# Patient Record
Sex: MALE | Race: WHITE | NOT HISPANIC OR LATINO | Employment: FULL TIME | URBAN - METROPOLITAN AREA
[De-identification: names, ages, dates, MRNs, and addresses within clinical notes are randomized per-mention and may not be internally consistent; named-entity substitution may affect disease eponyms.]

---

## 2023-05-24 ENCOUNTER — OFFICE VISIT (OUTPATIENT)
Dept: PRIMARY CARE CLINIC | Facility: CLINIC | Age: 38
End: 2023-05-24
Payer: COMMERCIAL

## 2023-05-24 VITALS
RESPIRATION RATE: 17 BRPM | HEART RATE: 68 BPM | OXYGEN SATURATION: 100 % | WEIGHT: 191.56 LBS | HEIGHT: 69 IN | DIASTOLIC BLOOD PRESSURE: 62 MMHG | TEMPERATURE: 98 F | SYSTOLIC BLOOD PRESSURE: 110 MMHG | BODY MASS INDEX: 28.37 KG/M2

## 2023-05-24 DIAGNOSIS — K52.9 ENTERITIS: Primary | ICD-10-CM

## 2023-05-24 PROCEDURE — 99203 PR OFFICE/OUTPT VISIT, NEW, LEVL III, 30-44 MIN: ICD-10-PCS | Mod: S$GLB,,, | Performed by: INTERNAL MEDICINE

## 2023-05-24 PROCEDURE — 3074F SYST BP LT 130 MM HG: CPT | Mod: CPTII,S$GLB,, | Performed by: INTERNAL MEDICINE

## 2023-05-24 PROCEDURE — 3008F PR BODY MASS INDEX (BMI) DOCUMENTED: ICD-10-PCS | Mod: CPTII,S$GLB,, | Performed by: INTERNAL MEDICINE

## 2023-05-24 PROCEDURE — 3008F BODY MASS INDEX DOCD: CPT | Mod: CPTII,S$GLB,, | Performed by: INTERNAL MEDICINE

## 2023-05-24 PROCEDURE — 3078F DIAST BP <80 MM HG: CPT | Mod: CPTII,S$GLB,, | Performed by: INTERNAL MEDICINE

## 2023-05-24 PROCEDURE — 1160F PR REVIEW ALL MEDS BY PRESCRIBER/CLIN PHARMACIST DOCUMENTED: ICD-10-PCS | Mod: CPTII,S$GLB,, | Performed by: INTERNAL MEDICINE

## 2023-05-24 PROCEDURE — 99999 PR PBB SHADOW E&M-NEW PATIENT-LVL III: ICD-10-PCS | Mod: PBBFAC,,, | Performed by: INTERNAL MEDICINE

## 2023-05-24 PROCEDURE — 99999 PR PBB SHADOW E&M-NEW PATIENT-LVL III: CPT | Mod: PBBFAC,,, | Performed by: INTERNAL MEDICINE

## 2023-05-24 PROCEDURE — 1159F PR MEDICATION LIST DOCUMENTED IN MEDICAL RECORD: ICD-10-PCS | Mod: CPTII,S$GLB,, | Performed by: INTERNAL MEDICINE

## 2023-05-24 PROCEDURE — 99203 OFFICE O/P NEW LOW 30 MIN: CPT | Mod: S$GLB,,, | Performed by: INTERNAL MEDICINE

## 2023-05-24 PROCEDURE — 3078F PR MOST RECENT DIASTOLIC BLOOD PRESSURE < 80 MM HG: ICD-10-PCS | Mod: CPTII,S$GLB,, | Performed by: INTERNAL MEDICINE

## 2023-05-24 PROCEDURE — 1159F MED LIST DOCD IN RCRD: CPT | Mod: CPTII,S$GLB,, | Performed by: INTERNAL MEDICINE

## 2023-05-24 PROCEDURE — 1160F RVW MEDS BY RX/DR IN RCRD: CPT | Mod: CPTII,S$GLB,, | Performed by: INTERNAL MEDICINE

## 2023-05-24 PROCEDURE — 3074F PR MOST RECENT SYSTOLIC BLOOD PRESSURE < 130 MM HG: ICD-10-PCS | Mod: CPTII,S$GLB,, | Performed by: INTERNAL MEDICINE

## 2023-05-24 NOTE — PROGRESS NOTES
"Subjective:       Patient ID: Igor Desai is a 37 y.o. male.    Chief Complaint: Diarrhea and Abdominal Pain      HPI:  Patient is a 37-year-old male presenting today with complaints of diarrhea.  He states 6 days ago he started with diarrhea and some abdominal cramping.  The cramping has sort of resolved over the a few days but the diarrhea remains.  He states he is having frequent watery diarrhea.  There has been no blood in the stools.  No reported mucus.  He is not having any fever or chills.  No vomiting.  He states that his 1-year-old had some diarrhea for a couple of days just before the symptoms started but he is not sure if it maybe related.  No other sick contacts that he is aware of.  No recent antibiotic use.  Four days ago he tried some Pepto-Bismol but it did not make any significant impact    Review of Systems   Constitutional:  Negative for fever and unexpected weight change.   HENT:  Negative for hearing loss, postnasal drip and rhinorrhea.    Eyes:  Negative for pain and visual disturbance.   Respiratory:  Negative for cough, shortness of breath and wheezing.    Cardiovascular:  Negative for chest pain and palpitations.   Gastrointestinal:  Positive for diarrhea. Negative for constipation, nausea and vomiting.   Genitourinary:  Negative for dysuria and hematuria.   Musculoskeletal:  Negative for arthralgias, back pain, myalgias and neck stiffness.   Skin:  Negative for pallor and rash.   Neurological:  Negative for seizures, syncope and headaches.   Hematological:  Negative for adenopathy.   Psychiatric/Behavioral:  Negative for dysphoric mood. The patient is not nervous/anxious.      Objective:   /62   Pulse 68   Temp 98.2 °F (36.8 °C) (Temporal)   Resp 17   Ht 5' 9" (1.753 m)   Wt 86.9 kg (191 lb 9.3 oz)   SpO2 100%   BMI 28.29 kg/m²      Physical Exam  Vitals reviewed.   Constitutional:       General: He is not in acute distress.     Appearance: He is well-developed.   HENT:    "   Head: Normocephalic and atraumatic.      Right Ear: Tympanic membrane and ear canal normal.      Left Ear: Tympanic membrane and ear canal normal.   Eyes:      Pupils: Pupils are equal, round, and reactive to light.   Neck:      Thyroid: No thyromegaly.      Vascular: No JVD.   Cardiovascular:      Rate and Rhythm: Normal rate and regular rhythm.      Heart sounds: Normal heart sounds. No murmur heard.    No friction rub. No gallop.   Pulmonary:      Effort: Pulmonary effort is normal.      Breath sounds: Normal breath sounds. No wheezing or rales.   Abdominal:      General: Bowel sounds are normal. There is no distension.      Palpations: Abdomen is soft.      Tenderness: There is no abdominal tenderness. There is no guarding or rebound.   Musculoskeletal:         General: Normal range of motion.      Cervical back: Normal range of motion and neck supple.   Lymphadenopathy:      Cervical: No cervical adenopathy.   Skin:     General: Skin is warm and dry.      Findings: No rash.   Neurological:      General: No focal deficit present.      Mental Status: He is alert and oriented to person, place, and time.      Cranial Nerves: No cranial nerve deficit.      Deep Tendon Reflexes: Reflexes are normal and symmetric.   Psychiatric:         Mood and Affect: Mood normal.         Judgment: Judgment normal.       No results found for any previous visit.       Assessment:       1. Enteritis        Plan:   No problem-specific Assessment & Plan notes found for this encounter.    Enteritis  Comments:  Push fluids, BRAT diet, immodium or kaopectate as directed, if not improved in 7 days, let us know          Follow up if symptoms worsen or fail to improve.

## 2023-05-24 NOTE — PATIENT INSTRUCTIONS
Patient Education       [place] Diet   About this topic   A bland diet is made up of foods which are soft, not spicy, cooked, low in fat, and low in fiber. These foods are not likely to upset the GI tract.  What will the results be?   This diet will not make your stomach upset. This diet will help you get nutrients while you do not feel well.  What changes to diet are needed?   Eat small meals more often during the day.  Avoid large meals.  Stay away from spicy or seasoned foods or other foods that should be avoided.  Stay away from fatty foods, like fried foods and high-fat dairy products.  Eat slowly and chew your food carefully.  Drink fluids slowly.  Do not eat for at least 2 hours before going to bed.  Who should use this diet?   People with ulcers, heartburn, upset stomach, gas, loose stools, or throwing up should eat a bland diet.  What foods are good to eat?   Low-fat milk and other dairy products  Lactose-free products, like soy milk  Cooked, canned, or frozen vegetables  Fruit and vegetable juices without pulp. Dilute fruit juices with water if you have a problem with loose stools.  Cooked or canned fruit with no skin or seeds, like applesauce  Ripe bananas and melons  Breads, crackers, and pasta made with white flour  Hot cereals like cream of rice or cream of wheat  Lean, tender meats that are steamed, baked, or grilled with no added fat  Creamy peanut butter  Eggs  Tofu  Soup and broth  Drinks without caffeine     What foods should be limited or avoided?   Avoid any food or drinks that make your symptoms worse  Full-fat dairy foods, like whole milk  Raw vegetables  Strong cheese, like kole cheese  Vegetables that can cause gas like broccoli, cabbage, and [place] sprouts  Any fruits or vegetables that cause heartburn symptoms like tomatoes and citrus fruits  Fresh fruits (besides ripe bananas and melons), dried fruits, or fruits canned in heavy syrup  Whole grain or bran cereals, bread, crackers, or  pasta  Fried pastries, such as donuts  Pickles, sauerkraut, and similar foods  Spicy foods and seasonings  Pepper  Foods with a lot of sugar or honey in them  Seeds and nuts  Higher fat cuts of meat, poultry with the skin, hotdogs, salami, and sausage  Meat or fish that has been seasoned or cured, like sardines and mohr  Fried foods  Chocolate  Beer, wine, and mixed drinks (alcohol)  Peppermint and spearmint flavored foods and drinks  Drinks with caffeine  When do I need to call the doctor?   You are not feeling better in 2 to 3 days or you are feeling worse  If you have questions about your diet  Signs of fluid loss. These include dark-colored urine or no urine for more than 8 hours, dry mouth, cracked lips, sunken eyes, lack of energy, feeling faint, or passing out.  Last Reviewed Date   2021-03-12  Consumer Information Use and Disclaimer   This information is not specific medical advice and does not replace information you receive from your health care provider. This is only a brief summary of general information. It does NOT include all information about conditions, illnesses, injuries, tests, procedures, treatments, therapies, discharge instructions or life-style choices that may apply to you. You must talk with your health care provider for complete information about your health and treatment options. This information should not be used to decide whether or not to accept your health care providers advice, instructions or recommendations. Only your health care provider has the knowledge and training to provide advice that is right for you.  Copyright   Copyright © 2021 [employer] and its affiliates and/or licensors. All rights reserved.

## 2023-07-31 ENCOUNTER — PATIENT MESSAGE (OUTPATIENT)
Dept: PRIMARY CARE CLINIC | Facility: CLINIC | Age: 38
End: 2023-07-31
Payer: COMMERCIAL

## 2023-07-31 DIAGNOSIS — Z80.9 FAMILY HISTORY OF CANCER: Primary | ICD-10-CM

## 2023-07-31 NOTE — TELEPHONE ENCOUNTER
Referral placed.    Orders Placed This Encounter   Procedures    Ambulatory referral/consult to Genetics     Standing Status:   Future     Standing Expiration Date:   8/31/2024     Referral Priority:   Routine     Referral Type:   Consultation     Referral Reason:   Specialty Services Required     Number of Visits Requested:   1

## 2023-08-10 NOTE — PROGRESS NOTES
"  Cancer Genetics  Hereditary/High Risk Clinic  Department of Hematology and Oncology  [facility]        Date of Service:  2023  Provider:  Kiana Antunez MS, [facility]    Patient Information  Name:  Igor Desai  :  1985  MRN:  [id number]        Referring Provider: Garo Huang MD    The chief complaint leading to consultation is: as below.  Visit type: in-person.  Face-to-face time with patient: approximately 45 minutes.  Approximately 70 minutes in total were spent on this encounter, which includes face-to-face time and non-face-to-face time preparing to see the patient (e.g., review of tests), obtaining and/or reviewing separately obtained history, documenting clinical information in the electronic or other health record, independently interpreting results (not separately reported) and communicating results to the patient/family/caregiver, or care coordination (not separately reported).      SUBJECTIVE:      Chief Complaint: Genetic Counseling    History of Present Illness (HPI):  Igor Desai ("Igor"), 37 y.o., assigned male sex at birth is new to the [facility] Department of H[facility]ology and On[facility] and to me.  He was referred by  Primary Care  for genetic cancer risk assessment given his family history of cancer. He has no personal history of cancer.    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    Patient's Distress Score today was 7/10 (with 10 being the worst).  Patient attributes this to his concern for his cancer risk and how the results of the genetic testing may directly and indirectly impact his children.  Patient is interested in information to understand his future cancer risks, potential cancer screening recommendations, and how the testing is completed.      SUBJECTIVE:   Records Reviewed  Medical History  Problem List  Any pertinent, available Procedures and Pathology reports in both [facility] and [facility]    COUNSELING   Causes of cancer  Germline cancer genetic testing is the testing of genes associated with cancer, known as cancer susceptibility genes.  Just as these genes are inherited from parents, mutations in these genes can be inherited, as well.  A mutation in a cancer susceptibility gene adversely affects the gene's ability to prevent cancer; therefore, carriers of cancer susceptibility gene mutations may be at increased risk for certain cancers.     Only 5-10% cancers are caused by a cancer susceptibility gene mutation, meaning the cancer is genetic/hereditary; rather, most cancers are sporadic.  Causes of sporadic cancers may include environmental risk factors, lifestyle risk factors, and non-modifiable risk factors.  It is important to note that members of a family often share not only their genetics but also risk factors including environmental and lifestyle risk factors, so cancers can be familial.    Mutations in BRCA1 and BRCA2 are associated with an increased risk for breast and ovarian cancer, in addition to male breast cancer, pancreatic, melanoma, and prostate cancer. Mutations in other genes may increase the risk of breast and prostate cancer, in addition to other cancers.     Potential results of genetic testing, and their implications  Potential results of genetic testing include positive, negative, and variant of unknown significance (VUS).    A  positive result indicates the presence of at least one clinically significant mutation, and the patient's associated cancer risks vary depending upon the cancer susceptibility gene(s) in which there is/are a mutation(s).  With a positive result, in some cases, depending upon the specific result and the patient's clinical history, modified risk management may be recommended, including measures for risk reduction and/or surveillance; however, modified management is not always an option.    A negative result indicates that no clinically significant mutations were identified in the gene(s) tested.    A VUS indicates that there is not presently enough data for the laboratory to make a determination as to whether the variant is clinically significant.  VUSs are not typically acted upon clinically.       The ability to interpret the meaning of a negative genetic testing result in genes associated with cancer with which the patient has not personally been affected, when done prior to testing the appropriate affected relative(s), is significantly limited.  A negative result in the patient does not indicate that she cannot develop cancer, and, in fact, the patient may even be at increased risk for cancer based on shared risk factors with affected relatives.  The most informative candidates for initial genetic testing in a family are those who have been affected with cancer.     Genetic mutation inheritance  If  Rolandaik tests positive for a mutation, his first-degree relatives would each have a 50% chance of having the same mutation, and other, more distantly related blood-relatives would also be at risk of having the same mutation.       Genetic discrimination  The Genetic Information Nondiscrimination Act (NGUY[place]N) is [place] federal legislation that provides some protections against use of an individual's genetic information by their health insurer and by their employer.  Title I of NGUY[place]N prohibits most health insurers from  utilizing an individual's genetic information to make decisions regarding insurance eligibility or premium charges.  Title II of NGUY[place]N prohibits covered entities, including employers, from requesting the genetic information of employees and applicants.  NGUY[place]N does not protect individuals from genetic discrimination toward health insurance obtained through a job with the  or through the Federal Employees Health Benefits Plan; from genetic discrimination by employers with fewer than 15 employees or if employed by the ; or from genetic discrimination by any other type of policies/entities, including but not limited to life insurance, disability insurance, long-term care insurance,  benefits, and [facility] benefits.     Genetic testing logistics  An outside laboratory would perform the testing after a blood sample is collected at [facility] or a saliva sample is collected by the patient at home.  With genetic testing, there is a potential for the patient to incur out-of-pocket costs.  Results can take 2-3 weeks.  Post-test genetic counseling can be conducted once the genetic testing results are available.     Assessment/Plan  Based on the information provided by  Igor, he meets current criteria for genetic testing of hereditary breast and ovarian cancer syndrome according to current clinical guidelines. Igor's clinical history, including personal history and/or family history, is suggestive of a hereditary cancer syndrome in the family given the family history of breast cancer diagnosed in his sister before age 50, in addition to the paternal family history of pancreatic cancer.     Offered germline cancer-genetic testing at this time versus deferring testing at this time or declining testing altogether.  Various test panel options were discussed. Igor decided to proceed with genetic testing through the 2-gene BRCA1/BRCA2 Core Panel and the 84-gene Multi-Cancer Panel (AIP, ALK, APC,  PIPO, AXIN2, BAP1, BARD1, BLM, BMPR1A, BRCA1, BRCA2, BRIP1, CASR, CDC73, CDH1, CDK4, CDKN1B, CDKN1C, CDKN2A, CEBPA, CHEK2, CTNNA1, DICER1, DIS3L2, EGFR, EPCAM, FH, FLCN, GATA2, GPC3, GREM1, HOXB13, HRAS, KIT, MAX, MEN1, MET, MITF, MLH1, MSH2, MSH3, MSH6, MUTYH, NBN, NF1, NF2, NTHL1, PALB2, PDGFRA, PHOX2B, PMS2, POLD1, POLE, POT1, SDNIV2Z, PTCH1, PTEN, RAD50, RAD51C, RAD51D, RB1, RECQL4, RET, RUNX1, SDHA, SDHAF2, SDHB, SDHC, SDHD, SMAD4, SMARCA4, SMARCB1, SMARCE1, STK11, SUFU, TERC, TERT, NVIK022, TP53, TSC1, TSC2, VHL, WRN, WT1).  Igor has provided his informed consent to proceed. A blood sample was collected today.     Questions were encouraged and answered to the patient's satisfaction, and he verbalized understanding of information and agreement with the plan.         Signed,    Kiana Antunez MS, [facility]  Certified Genetic Counselor, Hereditary and High-Risk Clinic  Hematology/Oncology, [facility]    08/11/2023

## 2023-08-11 ENCOUNTER — LAB VISIT (OUTPATIENT)
Dept: LAB | Facility: HOSPITAL | Age: 38
End: 2023-08-11
Payer: COMMERCIAL

## 2023-08-11 ENCOUNTER — OFFICE VISIT (OUTPATIENT)
Dept: GENETICS | Facility: CLINIC | Age: 38
End: 2023-08-11
Payer: COMMERCIAL

## 2023-08-11 DIAGNOSIS — Z80.0 FAMILY HISTORY OF PANCREATIC CANCER: ICD-10-CM

## 2023-08-11 DIAGNOSIS — Z80.52 FAMILY HISTORY OF BLADDER CANCER: ICD-10-CM

## 2023-08-11 DIAGNOSIS — Z80.3 FAMILY HISTORY OF BREAST CANCER: Primary | ICD-10-CM

## 2023-08-11 DIAGNOSIS — Z80.9 FAMILY HISTORY OF CANCER: ICD-10-CM

## 2023-08-11 DIAGNOSIS — Z80.51 FAMILY HISTORY OF KIDNEY CANCER: ICD-10-CM

## 2023-08-11 DIAGNOSIS — Z80.3 FAMILY HISTORY OF BREAST CANCER: ICD-10-CM

## 2023-08-11 PROCEDURE — 96040 PR GENETIC COUNSELING, EACH 30 MIN: ICD-10-PCS | Mod: S$GLB,,,

## 2023-08-11 PROCEDURE — 99999 PR PBB SHADOW E&M-EST. PATIENT-LVL I: CPT | Mod: PBBFAC,,,

## 2023-08-11 PROCEDURE — 36415 COLL VENOUS BLD VENIPUNCTURE: CPT | Performed by: INTERNAL MEDICINE

## 2023-08-11 PROCEDURE — 99999 PR PBB SHADOW E&M-EST. PATIENT-LVL I: ICD-10-PCS | Mod: PBBFAC,,,

## 2023-08-11 PROCEDURE — 96040 PR GENETIC COUNSELING, EACH 30 MIN: CPT | Mod: S$GLB,,,

## 2023-08-11 PROCEDURE — 99499 NO LOS: ICD-10-PCS | Mod: S$GLB,,,

## 2023-08-11 PROCEDURE — 99499 UNLISTED E&M SERVICE: CPT | Mod: S$GLB,,,

## 2023-08-25 ENCOUNTER — TELEPHONE (OUTPATIENT)
Dept: GENETICS | Facility: CLINIC | Age: 38
End: 2023-08-25
Payer: COMMERCIAL

## 2023-08-25 NOTE — TELEPHONE ENCOUNTER
Contacted Mr. 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 We briefly discussed this result. Informed him that the nurse will contact him to schedule a follow-up appointment. Igor expressed appreciation for the information.

## 2023-08-28 ENCOUNTER — TELEPHONE (OUTPATIENT)
Dept: GENETICS | Facility: CLINIC | Age: 38
End: 2023-08-28
Payer: COMMERCIAL

## 2023-08-28 NOTE — TELEPHONE ENCOUNTER
Spoke with Mr. Desai and scheduled a follow up visit with Kiana Antunez MS to discuss his results. Appt scheduled for 9.8.23.

## 2023-08-28 NOTE — TELEPHONE ENCOUNTER
----- Message from Kiana Antunez MS sent at 8/25/2023  2:50 PM CDT -----  Regarding: Follow-up genetics appt  Marlena Chatterjee,    Can you contact this patient to schedule a follow-up appointment given his BLM mutation? Also, he mentioned that he is mostly available on Fridays except next Friday.    Thank you,  Kiana

## 2023-09-08 ENCOUNTER — OFFICE VISIT (OUTPATIENT)
Dept: GENETICS | Facility: CLINIC | Age: 38
End: 2023-09-08
Payer: COMMERCIAL

## 2023-09-08 DIAGNOSIS — Z15.89 MONOALLELIC MUTATION OF BLM GENE: Primary | ICD-10-CM

## 2023-09-08 PROCEDURE — 96040 PR GENETIC COUNSELING, EACH 30 MIN: ICD-10-PCS | Mod: S$GLB,,,

## 2023-09-08 PROCEDURE — 99499 UNLISTED E&M SERVICE: CPT | Mod: S$GLB,,,

## 2023-09-08 PROCEDURE — 99499 NO LOS: ICD-10-PCS | Mod: S$GLB,,,

## 2023-09-08 PROCEDURE — 96040 PR GENETIC COUNSELING, EACH 30 MIN: CPT | Mod: S$GLB,,,

## 2023-09-08 NOTE — PROGRESS NOTES
"  Cancer Genetics  Hereditary/High Risk Clinic  Department of Hematology and Oncology  [facility]        Date of Service:  2023  Provider:  Kiana Antunez MS, [facility]    Patient Information  Name:  Igor Desai  :  1985  MRN:  [id number]        Referring Provider: Garo Huang MD    The chief complaint leading to consultation is: as below.  Visit type: in-person.  Face-to-face time with patient: approximately 30 minutes.  Approximately 70 minutes in total were spent on this encounter, which includes face-to-face time and non-face-to-face time preparing to see the patient (e.g., review of tests), obtaining and/or reviewing separately obtained history, documenting clinical information in the electronic or other health record, independently interpreting results (not   reported) and communicating results to the patient/family/caregiver, or care coordination (not separately reported).      SUBJECTIVE:      Chief Complaint: Genetic Counseling    History of Present Illness (HPI):  Igor Desai ("Igor"), 37 y.o., assigned male sex at birth is returning for post-test genetic counseling.  He initially presented on 2023 after being referred by Primary Care given his family history of cancer. He has no personal history of cancer. 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 We met today to discuss the results.    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.Yid44Gip) - VUS  Cancer:  No  Colonoscopy: No  IBS (age 20s)  Other benign tumor:  Yes  Benign skin findings (moles?)  Pancreatitis:  No  Pancreatic cyst:  No      Focused Surgical History  N/A     Ancestry:  Ashkenazi [employer] ancestry:  No  Paternal: Romansh  Maternal: Romansh     Focused Family History:  Consanguinity in ancestors:  No  Germline cancer-genetic testing in blood relatives:  Yes, " reported that sister previously underwent genetic testing ~5 years, negative per Igor  Cancer in family:  Yes; there are no known blood relatives affected with cancer other than those mentioned in the pedigree below     Family Cancer Pedigree:      Genetic Test Result:      Review of Systems:  See HPI.        SUBJECTIVE:   Records Reviewed  Medical History  Problem List  Any pertinent, available Procedures and Pathology reports in both [facility] and [facility]    COUNSELING   Genetic Testing  Due to his family history, he underwent genetic testing in August 2023. The [facility] Multi-Cancer Panel was ordered, which investigated the following 84 genes: AIP, ALK, APC, PIPO, AXIN2, BAP1, BARD1, BLM, BMPR1A, BRCA1, BRCA2, BRIP1, CASR, CDC73, CDH1, CDK4, CDKN1B, CDKN1C, CDKN2A, CEBPA, CHEK2, CTNNA1, DICER1, DIS3L2, EGFR, EPCAM, FH, FLCN, GATA2, GPC3, GREM1, HOXB13, HRAS, KIT, MAX, MEN1, MET, MITF, MLH1, MSH2, MSH3, MSH6, MUTYH, NBN, NF1, NF2, NTHL1, PALB2, PDGFRA, PHOX2B, PMS2, POLD1, POLE, POT1, CEBZB3Q, PTCH1, PTEN, RAD50, RAD51C, RAD51D, RB1, RECQL4, RET, RUNX1, SDHA, SDHAF2, SDHB, SDHC, SDHD, SMAD4, SMARCA4, SMARCB1, SMARCE1, STK11, SUFU, TERC, TERT, FCYH622, TP53, TSC1, TSC2, VHL, WRN, WT1.      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 This result does not explain the family history of cancer. No pathogenic mutations were identified in any of the other 83 genes investigated.      VUS  Of note, a genetic change called a variant of uncertain significance (VUS) was identified in the PTCH1 gene c.140G>A (p.Hcm09Vcb). Genetic changes, or variants, are termed VUS when there is insufficient information to know whether or not the change increases the risk of cancer. While pathogenic mutations in this gene may be associated with an increased risk of cancer, a VUS is not currently known to increase the risk of cancer. We do not recommend  changing medical management or cancer screening because of a VUS. The goal of genetic testing labs is to reclassify all VUS results as either a benign normal variant or a pathogenic mutation. Should the classification of this variant change, an updated report would be issued from the genetic testing lab ([facility]). Over time, the majority of VUS results are downgraded to benign, normal genetic variants.      Single BLM Mutation (Monoallelic or Heterozygote) Risks and Management  Monoallelic, or single, mutations in the BLM gene are not associated with increased risk for cancer, although this could change as more information about this mutation becomes available. Screening for cancers should be determined based on personal and family history.      Two BLM Mutations (Biallelic) - Bloom syndrome  There is a rare condition called Bloom syndrome, in which a person inherits two mutations, one in each copy of the BLM gene (one inherited from mother and one inherited from father). Cassidy syndrome typically presents with at birth and at infancy. Bloom syndrome is characterized by the following features:     Prenatal and  growth deficiencies (short stature)  Small head size (microcephaly)   Feeding problems early in life  Sun-sensitive red (erythematous) facial rash and other dermatologic findings   Endocrine abnormalities (including insulin resistance and an increased risk for diabetes)  Variable immune system problems (immunodeficiency)  Increased sensitivity to both DNA damaging chemicals and ionizing radiation, with a concern for an increased risk of additional treatment-related tumors   Increased risk for early-onset for leukemias and lymphomas (both are most common Bloom syndrome-related cancers), as well skin, colon, breast, and kidney (Wilms tumor) cancers  Infertility and reduced fertility (including premature menopause and sperm defects)     Because he only has one BLM mutation, Igor does not have Cassidy  syndrome but is a carrier for Bloom syndrome. If his children's mother were to also carry a single BLM mutation, then there would be a 25% (or 1 in 4) chance for each of his children to have Cassidy syndrome. Carrier testing, prenatal genetic testing, and preimplantation genetic testing with IVF are all available.     Implications for Relatives  Because a BLM mutation was identified in him, there is a 50% chance for each of her children to have inherited this mutation. If they did inherit it, they would also be carriers for Bloom syndrome. His children would only be at risk to have Bloom syndrome if their mother also had a BLM mutation.  We do not recommend testing children for this mutation, because the cancer risks and management recommendations are not relevant until adulthood. He likely inherited this BLM mutation from one of his parents, so his siblings also each have a 50% chance to have inherited it as well. There is also a chance that other relatives could carry this mutation. His relatives may consider genetic testing for this mutation if interested, although testing will likely not change their cancer screening unless they are identified to have Bloom syndrome. Testing may also benefit relatives who are still growing their families, as they may want to know if they are at risk to have a child with Bloom syndrome.     The genetic testing lab, [facility], will offer free genetic testing to any of your blood relatives within 150 days of your test (expires: January 21, 2024). We are happy to meet with relatives for genetic counseling and testing, or they can locate a genetic counselor near them at [email or web address].      Questions were encouraged and answered to the patient's satisfaction, and she verbalized understanding of information and agreement with the plan.         Signed,    Kiana Antunez MS, [facility]  Certified Genetic Counselor, Hereditary and High-Risk Clinic  Hematology/Oncology, [facility]    09/08/2023

## 2023-12-19 ENCOUNTER — OFFICE VISIT (OUTPATIENT)
Dept: INTERNAL MEDICINE | Facility: CLINIC | Age: 38
End: 2023-12-19
Payer: COMMERCIAL

## 2023-12-19 VITALS
DIASTOLIC BLOOD PRESSURE: 62 MMHG | BODY MASS INDEX: 29.71 KG/M2 | HEIGHT: 69 IN | TEMPERATURE: 100 F | SYSTOLIC BLOOD PRESSURE: 114 MMHG | OXYGEN SATURATION: 97 % | HEART RATE: 106 BPM | WEIGHT: 200.63 LBS

## 2023-12-19 DIAGNOSIS — J02.0 STREP THROAT: Primary | ICD-10-CM

## 2023-12-19 LAB
CTP QC/QA: YES
MOLECULAR STREP A: POSITIVE
POC MOLECULAR INFLUENZA A AGN: NEGATIVE
POC MOLECULAR INFLUENZA B AGN: NEGATIVE
SARS-COV-2 RDRP RESP QL NAA+PROBE: NEGATIVE

## 2023-12-19 PROCEDURE — 87502 INFLUENZA DNA AMP PROBE: CPT | Mod: QW,S$GLB,, | Performed by: NURSE PRACTITIONER

## 2023-12-19 PROCEDURE — 3074F SYST BP LT 130 MM HG: CPT | Mod: CPTII,S$GLB,, | Performed by: NURSE PRACTITIONER

## 2023-12-19 PROCEDURE — 3074F PR MOST RECENT SYSTOLIC BLOOD PRESSURE < 130 MM HG: ICD-10-PCS | Mod: CPTII,S$GLB,, | Performed by: NURSE PRACTITIONER

## 2023-12-19 PROCEDURE — 87635: ICD-10-PCS | Mod: QW,S$GLB,, | Performed by: NURSE PRACTITIONER

## 2023-12-19 PROCEDURE — 1159F PR MEDICATION LIST DOCUMENTED IN MEDICAL RECORD: ICD-10-PCS | Mod: CPTII,S$GLB,, | Performed by: NURSE PRACTITIONER

## 2023-12-19 PROCEDURE — 3008F PR BODY MASS INDEX (BMI) DOCUMENTED: ICD-10-PCS | Mod: CPTII,S$GLB,, | Performed by: NURSE PRACTITIONER

## 2023-12-19 PROCEDURE — 99999 PR PBB SHADOW E&M-EST. PATIENT-LVL III: CPT | Mod: PBBFAC,,, | Performed by: NURSE PRACTITIONER

## 2023-12-19 PROCEDURE — 87635 SARS-COV-2 COVID-19 AMP PRB: CPT | Mod: QW,S$GLB,, | Performed by: NURSE PRACTITIONER

## 2023-12-19 PROCEDURE — 3078F DIAST BP <80 MM HG: CPT | Mod: CPTII,S$GLB,, | Performed by: NURSE PRACTITIONER

## 2023-12-19 PROCEDURE — 3078F PR MOST RECENT DIASTOLIC BLOOD PRESSURE < 80 MM HG: ICD-10-PCS | Mod: CPTII,S$GLB,, | Performed by: NURSE PRACTITIONER

## 2023-12-19 PROCEDURE — 99999 PR PBB SHADOW E&M-EST. PATIENT-LVL III: ICD-10-PCS | Mod: PBBFAC,,, | Performed by: NURSE PRACTITIONER

## 2023-12-19 PROCEDURE — 1159F MED LIST DOCD IN RCRD: CPT | Mod: CPTII,S$GLB,, | Performed by: NURSE PRACTITIONER

## 2023-12-19 PROCEDURE — 99213 PR OFFICE/OUTPT VISIT, EST, LEVL III, 20-29 MIN: ICD-10-PCS | Mod: S$GLB,,, | Performed by: NURSE PRACTITIONER

## 2023-12-19 PROCEDURE — 87651 POCT STREP A MOLECULAR: ICD-10-PCS | Mod: QW,S$GLB,, | Performed by: NURSE PRACTITIONER

## 2023-12-19 PROCEDURE — 87651 STREP A DNA AMP PROBE: CPT | Mod: QW,S$GLB,, | Performed by: NURSE PRACTITIONER

## 2023-12-19 PROCEDURE — 87502 POCT INFLUENZA A/B MOLECULAR: ICD-10-PCS | Mod: QW,S$GLB,, | Performed by: NURSE PRACTITIONER

## 2023-12-19 PROCEDURE — 99213 OFFICE O/P EST LOW 20 MIN: CPT | Mod: S$GLB,,, | Performed by: NURSE PRACTITIONER

## 2023-12-19 PROCEDURE — 3008F BODY MASS INDEX DOCD: CPT | Mod: CPTII,S$GLB,, | Performed by: NURSE PRACTITIONER

## 2023-12-19 RX ORDER — AMOXICILLIN 875 MG/1
875 TABLET, FILM COATED ORAL EVERY 12 HOURS
Qty: 20 TABLET | Refills: 0 | Status: SHIPPED | OUTPATIENT
Start: 2023-12-19 | End: 2023-12-29

## 2023-12-19 NOTE — PROGRESS NOTES
"Subjective:       Patient ID: Igor Desai is a 37 y.o. male.    Chief Complaint: Sore Throat, Headache, Fever, and Chills (Started yesterday )    Pt presents to clinic today for sore throat, body aches, chills and fever  Started feeling bad yesterday  Sore throat hurts pretty bad  Tmax 101   Taking tylenol         /62   Pulse 106   Temp 100.2 °F (37.9 °C) (Tympanic)   Ht 5' 9" (1.753 m)   Wt 91 kg (200 lb 9.9 oz)   SpO2 97%   BMI 29.63 kg/m²     Review of Systems   Constitutional:  Positive for chills and fever. Negative for activity change, appetite change, diaphoresis, fatigue and unexpected weight change.   HENT:  Positive for postnasal drip, sore throat and trouble swallowing.    Eyes: Negative.    Respiratory:  Negative for apnea, chest tightness, shortness of breath and stridor.    Cardiovascular:  Negative for chest pain, palpitations and leg swelling.   Gastrointestinal: Negative.    Endocrine: Negative.    Genitourinary: Negative.    Musculoskeletal:  Negative for arthralgias and myalgias.   Skin:  Negative for color change, pallor, rash and wound.   Allergic/Immunologic: Negative.    Neurological:  Negative for dizziness, facial asymmetry, light-headedness and headaches.   Hematological:  Negative for adenopathy.   Psychiatric/Behavioral:  Negative for agitation and behavioral problems.        Objective:      Physical Exam  Vitals and nursing note reviewed.   Constitutional:       Appearance: He is well-developed.   HENT:      Head: Normocephalic and atraumatic.      Right Ear: Tympanic membrane, ear canal and external ear normal.      Left Ear: Tympanic membrane, ear canal and external ear normal.      Nose: Nose normal. No rhinorrhea.      Right Sinus: No maxillary sinus tenderness or frontal sinus tenderness.      Left Sinus: No maxillary sinus tenderness or frontal sinus tenderness.      Mouth/Throat:      Mouth: No oral lesions.      Pharynx: Uvula midline. Posterior oropharyngeal " erythema present. No oropharyngeal exudate or uvula swelling.      Tonsils: Tonsillar exudate present. No tonsillar abscesses.   Eyes:      General:         Right eye: No discharge.         Left eye: No discharge.      Conjunctiva/sclera: Conjunctivae normal.   Cardiovascular:      Rate and Rhythm: Normal rate and regular rhythm.      Heart sounds: Normal heart sounds. No murmur heard.  Pulmonary:      Effort: Pulmonary effort is normal. No respiratory distress.      Breath sounds: Normal breath sounds. No wheezing or rales.   Chest:      Chest wall: No tenderness.   Abdominal:      General: There is no distension.      Palpations: Abdomen is soft.      Tenderness: There is no abdominal tenderness.   Musculoskeletal:         General: No tenderness. Normal range of motion.      Cervical back: Normal range of motion.   Skin:     General: Skin is warm and dry.      Findings: No erythema or rash.   Neurological:      Mental Status: He is alert and oriented to person, place, and time.   Psychiatric:         Behavior: Behavior normal.         Thought Content: Thought content normal.         Judgment: Judgment normal.         Assessment:       1. Strep throat    2. BMI 29.0-29.9,adult        Plan:       Igor was seen today for sore throat, headache, fever and chills.    Diagnoses and all orders for this visit:    Strep throat  -     POCT COVID-19 Rapid Screening  -     POCT Strep A, Molecular  -     POCT Influenza A/B Molecular  -     amoxicillin (AMOXIL) 875 MG tablet; Take 1 tablet (875 mg total) by mouth every 12 (twelve) hours. for 10 days    BMI 29.0-29.9,adult      Tylenol/motrin PRN pain  Warm salt water gargles  Change tooth brush/paste after 24 hrs of antibiotics  Follow up for worsening or no improvement in symptoms and PRN.

## 2024-01-08 ENCOUNTER — OFFICE VISIT (OUTPATIENT)
Dept: INTERNAL MEDICINE | Facility: CLINIC | Age: 39
End: 2024-01-08
Payer: COMMERCIAL

## 2024-01-08 VITALS
SYSTOLIC BLOOD PRESSURE: 122 MMHG | HEIGHT: 69 IN | TEMPERATURE: 98 F | BODY MASS INDEX: 30.04 KG/M2 | WEIGHT: 202.81 LBS | OXYGEN SATURATION: 99 % | DIASTOLIC BLOOD PRESSURE: 64 MMHG | HEART RATE: 90 BPM

## 2024-01-08 DIAGNOSIS — J02.0 STREP THROAT: Primary | ICD-10-CM

## 2024-01-08 LAB
CTP QC/QA: YES
MOLECULAR STREP A: POSITIVE

## 2024-01-08 PROCEDURE — 1159F MED LIST DOCD IN RCRD: CPT | Mod: CPTII,S$GLB,, | Performed by: NURSE PRACTITIONER

## 2024-01-08 PROCEDURE — 3074F SYST BP LT 130 MM HG: CPT | Mod: CPTII,S$GLB,, | Performed by: NURSE PRACTITIONER

## 2024-01-08 PROCEDURE — 3078F DIAST BP <80 MM HG: CPT | Mod: CPTII,S$GLB,, | Performed by: NURSE PRACTITIONER

## 2024-01-08 PROCEDURE — 1160F RVW MEDS BY RX/DR IN RCRD: CPT | Mod: CPTII,S$GLB,, | Performed by: NURSE PRACTITIONER

## 2024-01-08 PROCEDURE — 3008F BODY MASS INDEX DOCD: CPT | Mod: CPTII,S$GLB,, | Performed by: NURSE PRACTITIONER

## 2024-01-08 PROCEDURE — 99999 PR PBB SHADOW E&M-EST. PATIENT-LVL IV: CPT | Mod: PBBFAC,,, | Performed by: NURSE PRACTITIONER

## 2024-01-08 PROCEDURE — 99213 OFFICE O/P EST LOW 20 MIN: CPT | Mod: S$GLB,,, | Performed by: NURSE PRACTITIONER

## 2024-01-08 PROCEDURE — 87651 STREP A DNA AMP PROBE: CPT | Mod: QW,S$GLB,, | Performed by: NURSE PRACTITIONER

## 2024-01-08 RX ORDER — CLINDAMYCIN HYDROCHLORIDE 300 MG/1
300 CAPSULE ORAL EVERY 8 HOURS
Qty: 30 CAPSULE | Refills: 0 | Status: SHIPPED | OUTPATIENT
Start: 2024-01-08 | End: 2024-01-18

## 2024-01-08 NOTE — PROGRESS NOTES
"Subjective:       Patient ID: Igor Desai is a 38 y.o. male.    Chief Complaint: Sore Throat    Pt presents to clinic today for sore throat, body aches, chills and fever  Started feeling bad a few days ago  Sore throat hurts pretty bad  Questionable fevers   Taking tylenol   Wife and him positive about 3 weeks ago and treated with amoxicillin  Both positive again- wife currently on clindamycin       /64   Pulse 90   Temp 97.7 °F (36.5 °C) (Tympanic)   Ht 5' 9" (1.753 m)   Wt 92 kg (202 lb 13.2 oz)   SpO2 99%   BMI 29.95 kg/m²     Review of Systems   Constitutional:  Positive for chills and fever. Negative for activity change, appetite change, diaphoresis, fatigue and unexpected weight change.   HENT:  Positive for postnasal drip, sore throat and trouble swallowing.    Eyes: Negative.    Respiratory:  Negative for apnea, chest tightness, shortness of breath and stridor.    Cardiovascular:  Negative for chest pain, palpitations and leg swelling.   Gastrointestinal: Negative.    Endocrine: Negative.    Genitourinary: Negative.    Musculoskeletal:  Negative for arthralgias and myalgias.   Skin:  Negative for color change, pallor, rash and wound.   Allergic/Immunologic: Negative.    Neurological:  Negative for dizziness, facial asymmetry, light-headedness and headaches.   Hematological:  Negative for adenopathy.   Psychiatric/Behavioral:  Negative for agitation and behavioral problems.        Objective:      Physical Exam  Vitals and nursing note reviewed.   Constitutional:       Appearance: He is well-developed.   HENT:      Head: Normocephalic and atraumatic.      Right Ear: Tympanic membrane, ear canal and external ear normal.      Left Ear: Tympanic membrane, ear canal and external ear normal.      Nose: Nose normal. No rhinorrhea.      Right Sinus: No maxillary sinus tenderness or frontal sinus tenderness.      Left Sinus: No maxillary sinus tenderness or frontal sinus tenderness.      Mouth/Throat: "      Mouth: No oral lesions.      Pharynx: Uvula midline. Posterior oropharyngeal erythema present. No oropharyngeal exudate or uvula swelling.      Tonsils: Tonsillar exudate present. No tonsillar abscesses.   Eyes:      General:         Right eye: No discharge.         Left eye: No discharge.      Conjunctiva/sclera: Conjunctivae normal.   Cardiovascular:      Rate and Rhythm: Normal rate and regular rhythm.      Heart sounds: Normal heart sounds. No murmur heard.  Pulmonary:      Effort: Pulmonary effort is normal. No respiratory distress.      Breath sounds: Normal breath sounds. No wheezing or rales.   Chest:      Chest wall: No tenderness.   Abdominal:      General: There is no distension.      Palpations: Abdomen is soft.      Tenderness: There is no abdominal tenderness.   Musculoskeletal:         General: No tenderness. Normal range of motion.      Cervical back: Normal range of motion.   Skin:     General: Skin is warm and dry.      Findings: No erythema or rash.   Neurological:      Mental Status: He is alert and oriented to person, place, and time.   Psychiatric:         Behavior: Behavior normal.         Thought Content: Thought content normal.         Judgment: Judgment normal.         Assessment:       1. Strep throat    2. BMI 29.0-29.9,adult        Plan:       Igor was seen today for sore throat.    Diagnoses and all orders for this visit:    Strep throat  -     POCT Strep A, Molecular  -     clindamycin (CLEOCIN) 300 MG capsule; Take 1 capsule (300 mg total) by mouth every 8 (eight) hours. for 10 days    BMI 29.0-29.9,adult      Tylenol/motrin PRN pain  Warm salt water gargles  Change tooth brush/paste after 24 hrs of antibiotics  Follow up for worsening or no improvement in symptoms and PRN.

## 2024-01-29 ENCOUNTER — OFFICE VISIT (OUTPATIENT)
Dept: INTERNAL MEDICINE | Facility: CLINIC | Age: 39
End: 2024-01-29
Payer: COMMERCIAL

## 2024-01-29 VITALS
SYSTOLIC BLOOD PRESSURE: 112 MMHG | BODY MASS INDEX: 30.14 KG/M2 | HEIGHT: 69 IN | OXYGEN SATURATION: 99 % | WEIGHT: 203.5 LBS | DIASTOLIC BLOOD PRESSURE: 64 MMHG | TEMPERATURE: 97 F | HEART RATE: 76 BPM

## 2024-01-29 DIAGNOSIS — J02.0 RECURRENT STREPTOCOCCAL PHARYNGITIS: Primary | ICD-10-CM

## 2024-01-29 LAB
CTP QC/QA: YES
MOLECULAR STREP A: POSITIVE

## 2024-01-29 PROCEDURE — 99214 OFFICE O/P EST MOD 30 MIN: CPT | Mod: S$GLB,,, | Performed by: NURSE PRACTITIONER

## 2024-01-29 PROCEDURE — 3074F SYST BP LT 130 MM HG: CPT | Mod: CPTII,S$GLB,, | Performed by: NURSE PRACTITIONER

## 2024-01-29 PROCEDURE — 1160F RVW MEDS BY RX/DR IN RCRD: CPT | Mod: CPTII,S$GLB,, | Performed by: NURSE PRACTITIONER

## 2024-01-29 PROCEDURE — 3008F BODY MASS INDEX DOCD: CPT | Mod: CPTII,S$GLB,, | Performed by: NURSE PRACTITIONER

## 2024-01-29 PROCEDURE — 1159F MED LIST DOCD IN RCRD: CPT | Mod: CPTII,S$GLB,, | Performed by: NURSE PRACTITIONER

## 2024-01-29 PROCEDURE — 87651 STREP A DNA AMP PROBE: CPT | Mod: QW,S$GLB,, | Performed by: NURSE PRACTITIONER

## 2024-01-29 PROCEDURE — 3078F DIAST BP <80 MM HG: CPT | Mod: CPTII,S$GLB,, | Performed by: NURSE PRACTITIONER

## 2024-01-29 PROCEDURE — 99999 PR PBB SHADOW E&M-EST. PATIENT-LVL IV: CPT | Mod: PBBFAC,,, | Performed by: NURSE PRACTITIONER

## 2024-01-29 RX ORDER — AMOXICILLIN AND CLAVULANATE POTASSIUM 875; 125 MG/1; MG/1
1 TABLET, FILM COATED ORAL EVERY 12 HOURS
Qty: 20 TABLET | Refills: 0 | Status: SHIPPED | OUTPATIENT
Start: 2024-01-29 | End: 2024-02-08

## 2024-01-29 NOTE — PROGRESS NOTES
"Subjective:       Patient ID: Igor Desai is a 38 y.o. male.    Chief Complaint: Sore Throat (Started Friday /Right side & ear pain on Saturday )    Pt presents to clinic today for sore throat, body aches, chills and fever  Started feeling bad a few days ago  Sore throat hurts pretty bad  Questionable fevers   Taking tylenol   Wife and him positive about 6 weeks treated with amoxicillin  Then he was positive again 3 weeks ago   Then on Friday started with symptoms again    We did discuss the fact that he could be a carrier of strep vs actual reinfection  Will refer to ENT for evaluation           /64   Pulse 76   Temp 97.4 °F (36.3 °C) (Tympanic)   Ht 5' 9" (1.753 m)   Wt 92.3 kg (203 lb 7.8 oz)   SpO2 99%   BMI 30.05 kg/m²     Review of Systems   Constitutional:  Positive for chills and fever. Negative for activity change, appetite change, diaphoresis, fatigue and unexpected weight change.   HENT:  Positive for postnasal drip, sore throat and trouble swallowing.    Eyes: Negative.    Respiratory:  Negative for apnea, chest tightness, shortness of breath and stridor.    Cardiovascular:  Negative for chest pain, palpitations and leg swelling.   Gastrointestinal: Negative.    Endocrine: Negative.    Genitourinary: Negative.    Musculoskeletal:  Negative for arthralgias and myalgias.   Skin:  Negative for color change, pallor, rash and wound.   Allergic/Immunologic: Negative.    Neurological:  Negative for dizziness, facial asymmetry, light-headedness and headaches.   Hematological:  Negative for adenopathy.   Psychiatric/Behavioral:  Negative for agitation and behavioral problems.        Objective:      Physical Exam  Vitals and nursing note reviewed.   Constitutional:       Appearance: He is well-developed.   HENT:      Head: Normocephalic and atraumatic.      Right Ear: Tympanic membrane, ear canal and external ear normal.      Left Ear: Tympanic membrane, ear canal and external ear normal.      " Nose: Nose normal. No rhinorrhea.      Right Sinus: No maxillary sinus tenderness or frontal sinus tenderness.      Left Sinus: No maxillary sinus tenderness or frontal sinus tenderness.      Mouth/Throat:      Mouth: No oral lesions.      Pharynx: Uvula midline. Posterior oropharyngeal erythema present. No oropharyngeal exudate or uvula swelling.      Tonsils: Tonsillar exudate present. No tonsillar abscesses.   Eyes:      General:         Right eye: No discharge.         Left eye: No discharge.      Conjunctiva/sclera: Conjunctivae normal.   Cardiovascular:      Rate and Rhythm: Normal rate and regular rhythm.      Heart sounds: Normal heart sounds. No murmur heard.  Pulmonary:      Effort: Pulmonary effort is normal. No respiratory distress.      Breath sounds: Normal breath sounds. No wheezing or rales.   Chest:      Chest wall: No tenderness.   Abdominal:      General: There is no distension.      Palpations: Abdomen is soft.      Tenderness: There is no abdominal tenderness.   Musculoskeletal:         General: No tenderness. Normal range of motion.      Cervical back: Normal range of motion.   Skin:     General: Skin is warm and dry.      Findings: No erythema or rash.   Neurological:      Mental Status: He is alert and oriented to person, place, and time.   Psychiatric:         Behavior: Behavior normal.         Thought Content: Thought content normal.         Judgment: Judgment normal.         Assessment:       1. Recurrent streptococcal pharyngitis    2. BMI 30.0-30.9,adult        Plan:       Igor was seen today for sore throat.    Diagnoses and all orders for this visit:    Recurrent streptococcal pharyngitis  -     POCT Strep A, Molecular  -     Ambulatory referral/consult to ENT; Future  -     amoxicillin-clavulanate 875-125mg (AUGMENTIN) 875-125 mg per tablet; Take 1 tablet by mouth every 12 (twelve) hours. for 10 days    BMI 30.0-30.9,adult      Strep positive again  Given that he is symptomatic, will  treat   ENT referral   Tylenol/motrin PRN pain  Warm salt water gargles  Change tooth brush/paste after 24 hrs of antibiotics  Follow up for worsening or no improvement in symptoms and PRN.

## 2024-01-30 ENCOUNTER — OFFICE VISIT (OUTPATIENT)
Dept: OTOLARYNGOLOGY | Facility: CLINIC | Age: 39
End: 2024-01-30
Payer: COMMERCIAL

## 2024-01-30 VITALS — WEIGHT: 202.19 LBS | BODY MASS INDEX: 29.85 KG/M2

## 2024-01-30 DIAGNOSIS — J02.0 RECURRENT STREPTOCOCCAL PHARYNGITIS: ICD-10-CM

## 2024-01-30 PROCEDURE — 3008F BODY MASS INDEX DOCD: CPT | Mod: CPTII,S$GLB,, | Performed by: OTOLARYNGOLOGY

## 2024-01-30 PROCEDURE — 99999 PR PBB SHADOW E&M-EST. PATIENT-LVL III: CPT | Mod: PBBFAC,,, | Performed by: OTOLARYNGOLOGY

## 2024-01-30 PROCEDURE — 1159F MED LIST DOCD IN RCRD: CPT | Mod: CPTII,S$GLB,, | Performed by: OTOLARYNGOLOGY

## 2024-01-30 PROCEDURE — 99203 OFFICE O/P NEW LOW 30 MIN: CPT | Mod: S$GLB,,, | Performed by: OTOLARYNGOLOGY

## 2024-01-30 NOTE — PROGRESS NOTES
"Referring Provider:    Dolores Strong Np  [id number] [place]  [place] [id number]  Subjective:   Patient: Igor Desai [id number], :1985   Visit date:2024 1:52 PM    Chief Complaint:  Sore Throat (Pt is coming in today for recurrent strep he states that he has had it three times since December )    HPI:    Prior notes reviewed by myself.  Clinical documentation obtained by nursing staff reviewed.     38-year-old gentleman presents for evaluation of recurrent streptococcal pharyngitis.  He has had 3 episodes of streptococcal pharyngitis since mid December treated with antibiotics.  He was initially on amoxicillin, clindamycin for the 2nd episode and was just started on Augmentin this past Friday.  He lives with his wife and 2 children and they have had strep positive pharyngitis intermittently as well.  Prior to his initial episode in December, he had not had any issues with pharyngitis.  He states that he and his wife have made attempts to minimize eating/drinking after each other or the children and that they have thrown out the old toothbrush etc..  His 2-year-old is in       Objective:     Physical Exam:  Vitals:  Wt 91.7 kg (202 lb 2.6 oz)   BMI 29.85 kg/m²   General appearance:  Well developed, well nourished    Ears:  Otoscopy of external auditory canals and tympanic membranes was normal, clinical speech reception thresholds grossly intact, no mass/lesion of auricle.    Nose:  No masses/lesions of external nose, nasal mucosa, septum, and turbinates were within normal limits.    Mouth:  No mass/lesion of lips, teeth, gums, hard/soft palate, tongue, 3+ cryptic tonsils, or oropharynx.    Neck & Lymphatics:  No cervical lymphadenopathy, no neck mass/crepitus/ asymmetry, trachea is midline, no thyroid enlargement/tenderness/mass.        [x]  Data Reviewed:    No results found for: "WBC", "HGB", "HCT", "MCV", "LABPLAT", "EOSINOPHIL"            Assessment & Plan:   Recurrent streptococcal " pharyngitis  -     Ambulatory referral/consult to ENT        We reviewed his recent history.  I suspect that he is getting exposed to strep either via one of his family members or some other avenue.  He is going to take all possible measures to avoid exposure and also will discuss this further with his child's pediatrician.  We did briefly discuss indications for tonsillectomy which he does not meet at this time.

## 2024-03-20 ENCOUNTER — PATIENT MESSAGE (OUTPATIENT)
Dept: INTERNAL MEDICINE | Facility: CLINIC | Age: 39
End: 2024-03-20
Payer: COMMERCIAL

## 2024-04-15 ENCOUNTER — PATIENT MESSAGE (OUTPATIENT)
Dept: SPORTS MEDICINE | Facility: CLINIC | Age: 39
End: 2024-04-15
Payer: COMMERCIAL

## 2024-04-15 DIAGNOSIS — M25.551 RIGHT HIP PAIN: Primary | ICD-10-CM

## 2024-04-19 ENCOUNTER — PATIENT MESSAGE (OUTPATIENT)
Dept: RESEARCH | Facility: HOSPITAL | Age: 39
End: 2024-04-19
Payer: COMMERCIAL

## 2024-04-23 ENCOUNTER — TELEPHONE (OUTPATIENT)
Dept: SPORTS MEDICINE | Facility: CLINIC | Age: 39
End: 2024-04-23

## 2024-04-23 ENCOUNTER — HOSPITAL ENCOUNTER (OUTPATIENT)
Dept: RADIOLOGY | Facility: HOSPITAL | Age: 39
Discharge: HOME | End: 2024-04-23
Attending: STUDENT IN AN ORGANIZED HEALTH CARE EDUCATION/TRAINING PROGRAM
Payer: COMMERCIAL

## 2024-04-23 ENCOUNTER — OFFICE VISIT (OUTPATIENT)
Dept: SPORTS MEDICINE | Facility: CLINIC | Age: 39
End: 2024-04-23
Payer: COMMERCIAL

## 2024-04-23 VITALS — RESPIRATION RATE: 17 BRPM | BODY MASS INDEX: 29.95 KG/M2 | HEIGHT: 69 IN | WEIGHT: 202.19 LBS

## 2024-04-23 DIAGNOSIS — G89.29 CHRONIC RIGHT HIP PAIN: ICD-10-CM

## 2024-04-23 DIAGNOSIS — G57.01 PIRIFORMIS SYNDROME OF RIGHT SIDE: Primary | ICD-10-CM

## 2024-04-23 DIAGNOSIS — M25.551 RIGHT HIP PAIN: ICD-10-CM

## 2024-04-23 DIAGNOSIS — M25.551 CHRONIC RIGHT HIP PAIN: ICD-10-CM

## 2024-04-23 PROCEDURE — 99999 PR PBB SHADOW E&M-EST. PATIENT-LVL III: CPT | Mod: PBBFAC,,, | Performed by: STUDENT IN AN ORGANIZED HEALTH CARE EDUCATION/TRAINING PROGRAM

## 2024-04-23 PROCEDURE — 99203 OFFICE O/P NEW LOW 30 MIN: CPT | Mod: S$GLB,,, | Performed by: STUDENT IN AN ORGANIZED HEALTH CARE EDUCATION/TRAINING PROGRAM

## 2024-04-23 PROCEDURE — 3008F BODY MASS INDEX DOCD: CPT | Mod: CPTII,S$GLB,, | Performed by: STUDENT IN AN ORGANIZED HEALTH CARE EDUCATION/TRAINING PROGRAM

## 2024-04-23 PROCEDURE — 1160F RVW MEDS BY RX/DR IN RCRD: CPT | Mod: CPTII,S$GLB,, | Performed by: STUDENT IN AN ORGANIZED HEALTH CARE EDUCATION/TRAINING PROGRAM

## 2024-04-23 PROCEDURE — 73503 X-RAY EXAM HIP UNI 4/> VIEWS: CPT | Mod: 26,RT,, | Performed by: RADIOLOGY

## 2024-04-23 PROCEDURE — 1159F MED LIST DOCD IN RCRD: CPT | Mod: CPTII,S$GLB,, | Performed by: STUDENT IN AN ORGANIZED HEALTH CARE EDUCATION/TRAINING PROGRAM

## 2024-04-23 PROCEDURE — 73503 X-RAY EXAM HIP UNI 4/> VIEWS: CPT | Mod: TC,RT

## 2024-04-23 NOTE — PROGRESS NOTES
Orthopaedics Sports Medicine     Hip Initial Visit         4/23/2024    Referring MD: Self, Aaareferral    Chief Complaint   Patient presents with    Right Hip - Pain         History of Present Illness:   Igor Desai is a 38 y.o. male who presents with right hip pain and dysfunction.    Onset of the symptoms was 2-3 months ago.     Inciting event: No specific injury or trauma     Current symptoms include right hip pain, mostly localized lateral to the hip. He describes the pain as intermittent sharp pain. Had mostly been random pain and was never able to elicit the pain himself. Would notice it while sitting, walking, or laying down. He rates the pain a 4/10 today.  Reports some occasional tingling to right calf and below especially with prolonged sitting.that he describes as his leg falling asleep. Denies any prior history of injury or trauma to the hip. Denies back pain.     Pain is aggravated by prolonged sitting.     Evaluation to date: XR     Treatment to date: Rest, activity modification     Past Medical History:   Past Medical History:   Diagnosis Date    IBS (irritable bowel syndrome)        Past Surgical History:   Past Surgical History:   Procedure Laterality Date    VASECTOMY  12/14/2023       Medications:  Patient's Medications    No medications on file       Allergies: Review of patient's allergies indicates:  No Known Allergies    Social History:   Home town: [place].   Occupation:  at [employer]  Alcohol use: He reports current alcohol use.  Tobacco use: He reports that he has never smoked. He has never been exposed to tobacco smoke. He has never used smokeless tobacco.    Review of systems:  History of recent illness, fevers, shakes, or chills: no  History of cardiac problems or chest pain: no  History of pulmonary problems or asthma: no  History of diabetes: no  History of prior dvt or clotting problems: no  History of sleep apnea: no      Physical Examination:  Estimated body mass  "index is 29.85 kg/m² as calculated from the following:    Height as of this encounter: 5' 9" (1.753 m).    Weight as of this encounter: 91.7 kg (202 lb 2.6 oz).    General  Healthy appearing male in no acute distress  Alert and oriented, normal mood, appropriate affect    Ortho Exam    Right Hip:    Inspection:   Normal    Palpation tenderness: Glute medius     Range of motion:  100 deg Flexion (100 on left)          20 deg IR (20 on left)          60 deg ER (60 on left)    Strength:   5/5 Extension     5/5 Flexion     5/5 Abduction     5/5 Adduction    Special Tests:   Negative Log Roll     Negative MIKAEL     Negative FADIR     Negative Microinstability test     Negative Circumduction     Negative Ej's         N/V Exam:   Tibial:    Normal sensory (plantar foot)   Normal motor (FHL)     Sup Peroneal:  Normal sensory (dorsal foot)   Normal motor (Peroneals)             Deep Peroneal:  Normal sensory (1st web space)  Normal motor (EHL)     Sural:   Normal sensory (lateral foot)    Saphenous:   Normal sensory (medial lower leg)    Normal pedal pulses, warm and well perfused with capillary refill < 2 sec   Patella tendon reflex normal  Achilles tendon reflex normal      Imaging:    X-Ray Hip 4 or more views Right (with Pelvis when performed)  Narrative: EXAM: XR HIP WITH PELVIS WHEN PERFORMED, 4 OR MORE VIEWS RIGHT    CLINICAL HISTORY:  Right hip pain.    FINDINGS: 4 views right hip. No fracture, suspicious bone marrow lesion or other acute osseous abnormality is identified.  Joint alignment is anatomic.  The joint space of the right hip is well maintained.  The sacroiliac joints and pubic symphysis are within normal limits.  Mild constipation.  Impression:   No significant radiographic abnormality of the right hip.    Finalized on: 4/23/2024 8:25 AM By:  Eulalio Shields MD  [facility]# [id number]      2024-04-23 08:27:[id number]    [facility]          Physician Read: I agree with the above impression.      Impression:  38 y.o. male " with right hip pain, piriformis syndrome        Plan:  Discussed diagnosis and treatment options with the patient today. He has had right hip pain for the past few of months with no injury or trauma. Symptoms and physical exam is most consistent with piriformis syndrome.  He has pain in the right buttock area with occasional numbness and feeling of his right lower extremity being asleep with prolonged sitting.  Discussed non-operative treatment options in the form of rest, activity modifications, oral anti-inflammatories, oral steroids, and physical therapy/physician directed home exercise program dedicated to hip and core flexibility and strength.   I recommend oral anti-inflammatory and physical therapy.  Physical therapy will focus on core stretching and strengthening.  PT referral sent today.  The patient is in agreement with this plan.   Follow up with me as needed.            Vitor Marion MD    I, Stan Coleman, acted as a scribe for Vitor Marion MD for the duration of this office visit.

## 2025-01-15 ENCOUNTER — OFFICE VISIT (OUTPATIENT)
Dept: INTERNAL MEDICINE | Facility: CLINIC | Age: 40
End: 2025-01-15
Payer: COMMERCIAL

## 2025-01-15 VITALS
TEMPERATURE: 96 F | DIASTOLIC BLOOD PRESSURE: 76 MMHG | HEIGHT: 69 IN | BODY MASS INDEX: 29.98 KG/M2 | HEART RATE: 60 BPM | WEIGHT: 202.38 LBS | OXYGEN SATURATION: 99 % | SYSTOLIC BLOOD PRESSURE: 118 MMHG

## 2025-01-15 DIAGNOSIS — J02.9 VIRAL PHARYNGITIS: Primary | ICD-10-CM

## 2025-01-15 LAB
CTP QC/QA: YES
MOLECULAR STREP A: NEGATIVE

## 2025-01-15 PROCEDURE — 3074F SYST BP LT 130 MM HG: CPT | Mod: CPTII,S$GLB,, | Performed by: NURSE PRACTITIONER

## 2025-01-15 PROCEDURE — 3078F DIAST BP <80 MM HG: CPT | Mod: CPTII,S$GLB,, | Performed by: NURSE PRACTITIONER

## 2025-01-15 PROCEDURE — 1159F MED LIST DOCD IN RCRD: CPT | Mod: CPTII,S$GLB,, | Performed by: NURSE PRACTITIONER

## 2025-01-15 PROCEDURE — 99213 OFFICE O/P EST LOW 20 MIN: CPT | Mod: S$GLB,,, | Performed by: NURSE PRACTITIONER

## 2025-01-15 PROCEDURE — 87651 STREP A DNA AMP PROBE: CPT | Mod: QW,S$GLB,, | Performed by: NURSE PRACTITIONER

## 2025-01-15 PROCEDURE — 1160F RVW MEDS BY RX/DR IN RCRD: CPT | Mod: CPTII,S$GLB,, | Performed by: NURSE PRACTITIONER

## 2025-01-15 PROCEDURE — 3008F BODY MASS INDEX DOCD: CPT | Mod: CPTII,S$GLB,, | Performed by: NURSE PRACTITIONER

## 2025-01-15 PROCEDURE — 99999 PR PBB SHADOW E&M-EST. PATIENT-LVL III: CPT | Mod: PBBFAC,,, | Performed by: NURSE PRACTITIONER

## 2025-01-15 NOTE — PROGRESS NOTES
"Subjective:       Patient ID: Igor Desai is a 39 y.o. male.    CHIEF COMPLAINT:  - Mr. Desai presents with right tonsil pain and inner ear discomfort, potentially related to an infection.    HPI:  Mr. Desai reports discomfort in his right tonsil, which he describes as neither a typical sore throat nor a sharp pain, along with an itching sensation inside his right ear. These symptoms have been present for approximately 1 week and are intermittent in nature. He compares this episode to a similar illness last January, noting that the current symptoms are less severe. Mr. Desai's wife has similar symptoms, and he believes his 3-year-old son is also ill, suggesting a possible contagious condition.     Mr. Desai denies having allergies or symptoms such as sneezing, itchy eyes, or watery eyes.        /76 (Patient Position: Sitting)   Pulse 60   Temp 96.4 °F (35.8 °C) (Tympanic)   Ht 5' 9" (1.753 m)   Wt 91.8 kg (202 lb 6.1 oz)   SpO2 99%   BMI 29.89 kg/m²     Review of Systems   Constitutional:  Negative for activity change, appetite change, chills, diaphoresis, fatigue, fever and unexpected weight change.   HENT:  Positive for congestion, ear pain and sore throat.    Eyes: Negative.    Respiratory:  Negative for apnea, chest tightness, shortness of breath and stridor.    Cardiovascular:  Negative for chest pain, palpitations and leg swelling.   Gastrointestinal: Negative.    Endocrine: Negative.    Genitourinary: Negative.    Musculoskeletal:  Negative for arthralgias and myalgias.   Skin:  Negative for color change, pallor, rash and wound.   Allergic/Immunologic: Negative.    Neurological:  Negative for dizziness, facial asymmetry, light-headedness and headaches.   Hematological:  Negative for adenopathy.   Psychiatric/Behavioral:  Negative for agitation and behavioral problems.        Objective:      Physical Exam  Vitals and nursing note reviewed.   Constitutional:       General: " He is not in acute distress.     Appearance: He is well-developed. He is not diaphoretic.   HENT:      Head: Normocephalic and atraumatic.      Right Ear: A middle ear effusion is present.      Mouth/Throat:      Pharynx: Posterior oropharyngeal erythema present.      Tonsils: No tonsillar exudate.   Cardiovascular:      Rate and Rhythm: Normal rate and regular rhythm.      Heart sounds: Normal heart sounds.   Pulmonary:      Effort: Pulmonary effort is normal. No respiratory distress.      Breath sounds: Normal breath sounds.   Skin:     General: Skin is warm and dry.      Findings: No rash.   Neurological:      Mental Status: He is alert and oriented to person, place, and time.   Psychiatric:         Behavior: Behavior normal.         Thought Content: Thought content normal.         Judgment: Judgment normal.         Assessment and Plan        Igor was seen today for sore throat and otalgia.    Diagnoses and all orders for this visit:    Viral pharyngitis  -     POCT Strep A, Molecular    BMI 29.0-29.9,adult      There are no Patient Instructions on file for this visit.      1. Viral pharyngitis    2. BMI 29.0-29.9,adult        Assessment & Plan    TONSILLITIS:  - Observed enlarged tonsils during exam, which may be contributing to the patient's symptoms.  - Noted fluid and tenderness in the tonsil area.  - Mr. Desai reports pain in the right tonsil.  - Explained to the patient that enlarged tonsils can sometimes become irritated, potentially causing discomfort.    FOLLOW UP:  - strep negative. Rec otc allergy meds and flonase nasal spray for 5 days  - Follow up for worsening or no improvement in symptoms and PRN.           This note was generated with the assistance of ambient listening technology. Verbal consent was obtained by the patient and accompanying visitor(s) for the recording of patient appointment to facilitate this note. I attest to having reviewed and edited the generated note for accuracy,  though some syntax or spelling errors may persist. Please contact the author of this note for any clarification.

## 2025-02-26 ENCOUNTER — OFFICE VISIT (OUTPATIENT)
Dept: INTERNAL MEDICINE | Facility: CLINIC | Age: 40
End: 2025-02-26
Payer: COMMERCIAL

## 2025-02-26 VITALS
DIASTOLIC BLOOD PRESSURE: 62 MMHG | WEIGHT: 207.88 LBS | OXYGEN SATURATION: 99 % | SYSTOLIC BLOOD PRESSURE: 100 MMHG | HEART RATE: 66 BPM | TEMPERATURE: 98 F | BODY MASS INDEX: 30.7 KG/M2

## 2025-02-26 DIAGNOSIS — Z00.00 ROUTINE GENERAL MEDICAL EXAMINATION AT HEALTH CARE FACILITY: Primary | ICD-10-CM

## 2025-02-26 DIAGNOSIS — Z23 NEED FOR VACCINATION: ICD-10-CM

## 2025-02-26 PROCEDURE — 90656 IIV3 VACC NO PRSV 0.5 ML IM: CPT | Mod: S$GLB,,, | Performed by: INTERNAL MEDICINE

## 2025-02-26 PROCEDURE — 99395 PREV VISIT EST AGE 18-39: CPT | Mod: 25,S$GLB,, | Performed by: INTERNAL MEDICINE

## 2025-02-26 PROCEDURE — 90471 IMMUNIZATION ADMIN: CPT | Mod: S$GLB,,, | Performed by: INTERNAL MEDICINE

## 2025-02-26 PROCEDURE — 3008F BODY MASS INDEX DOCD: CPT | Mod: CPTII,S$GLB,, | Performed by: INTERNAL MEDICINE

## 2025-02-26 PROCEDURE — 3074F SYST BP LT 130 MM HG: CPT | Mod: CPTII,S$GLB,, | Performed by: INTERNAL MEDICINE

## 2025-02-26 PROCEDURE — 3078F DIAST BP <80 MM HG: CPT | Mod: CPTII,S$GLB,, | Performed by: INTERNAL MEDICINE

## 2025-02-26 PROCEDURE — 1159F MED LIST DOCD IN RCRD: CPT | Mod: CPTII,S$GLB,, | Performed by: INTERNAL MEDICINE

## 2025-02-26 PROCEDURE — 99999 PR PBB SHADOW E&M-EST. PATIENT-LVL III: CPT | Mod: PBBFAC,,, | Performed by: INTERNAL MEDICINE

## 2025-02-26 NOTE — PROGRESS NOTES
Subjective:       Patient ID: Igor Desai is a 39 y.o. male.    Chief Complaint: Annual Exam      HPI:  History of Present Illness    Patient presents today for routine check up    He has history of piriformis syndrome in right hip last spring, which resolved after identifying causative factor was use of new holster. 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    Mother  from esophageal cancer and father  from bladder cancer.    He reports normal bowel movements and denies blood in stool or black tarry stools.    He has not received influenza vaccination for the current season.         Review of Systems   Constitutional:  Negative for activity change and unexpected weight change.   HENT:  Negative for hearing loss, rhinorrhea and trouble swallowing.    Eyes:  Negative for discharge and visual disturbance.   Respiratory:  Negative for chest tightness and wheezing.    Cardiovascular:  Negative for chest pain and palpitations.   Gastrointestinal:  Negative for blood in stool, constipation, diarrhea and vomiting.   Endocrine: Negative for polydipsia and polyuria.   Genitourinary:  Negative for difficulty urinating, hematuria and urgency.   Musculoskeletal:  Negative for arthralgias, joint swelling and neck pain.   Neurological:  Negative for weakness and headaches.   Psychiatric/Behavioral:  Negative for confusion and dysphoric mood.        Objective:   /62   Pulse 66   Temp 98 °F (36.7 °C) (Tympanic)   Wt 94.3 kg (207 lb 14.3 oz)   SpO2 99%   BMI 30.70 kg/m²      Physical Exam  Vitals reviewed.   Constitutional:       General: He is not in acute distress.     Appearance: He is well-developed.   HENT:      Head: Normocephalic and atraumatic.      Right Ear: Tympanic membrane, ear canal and external ear normal.      Left Ear: Tympanic membrane, ear canal and external ear normal.   Eyes:      Pupils: Pupils are equal, round, and  reactive to light.   Neck:      Thyroid: No thyromegaly.      Vascular: No JVD.   Cardiovascular:      Rate and Rhythm: Normal rate and regular rhythm.      Heart sounds: Normal heart sounds. No murmur heard.     No friction rub. No gallop.   Pulmonary:      Effort: Pulmonary effort is normal.      Breath sounds: Normal breath sounds. No wheezing or rales.   Abdominal:      General: Bowel sounds are normal. There is no distension.      Palpations: Abdomen is soft.      Tenderness: There is no abdominal tenderness. There is no guarding or rebound.   Musculoskeletal:         General: Normal range of motion.      Cervical back: Normal range of motion and neck supple.   Lymphadenopathy:      Cervical: No cervical adenopathy.   Skin:     General: Skin is warm and dry.      Findings: No rash.   Neurological:      General: No focal deficit present.      Mental Status: He is alert and oriented to person, place, and time.      Cranial Nerves: No cranial nerve deficit.      Deep Tendon Reflexes: Reflexes are normal and symmetric.   Psychiatric:         Mood and Affect: Mood normal.         Judgment: Judgment normal.             Assessment:       1. Routine general medical examination at health care facility    2. Need for vaccination        Plan:   No problem-specific Assessment & Plan notes found for this encounter.    Igor was seen today for annual exam.    Diagnoses and all orders for this visit:    Routine general medical examination at [facility] care facility  -     CBC Auto Differential; Future  -     Comprehensive Metabolic Panel; Future  -     Lipid Panel; Future  -     PSA, Screening; Future  -     Hepatitis C Antibody; Future  -     Hemoglobin A1C; Future    Need for vaccination  -     Influenza - Trivalent - PF (ADULT)      Assessment & Plan    ANNUAL CHECKUP:  - Conducted annual checkup for the patient who has not had one in years.    FLU VACCINATION:  - Administered flu vaccine in office due to upcoming travel and  increased risk of exposure.    LABS:  - Planned to review lab results and address any abnormalities, with initial focus on lifestyle modifications rather than pharmacological interventions if minor issues arise.  - Explained fasting requirements for upcoming labs.  - Ordered labs including complete blood count and lipid panel.  - Scheduled follow-up on Friday, January 28, 2025 for fasting labs.    FAMILY HISTORY:  - Noted the patient's family history of esophageal cancer in a parent.  - Assessed family history, noting no changes since last visit.  - Noted the patient's family history of bladder cancer in a parent.    HEALTH MAINTENANCE:  - Reviewed general health habits including proper diet, exercise, avoiding smoking, using seatbelts, and applying sunscreen.  - Patient to continue maintaining good health habits, use seatbelt, and apply sunscreen.    FOLLOW UP:  - Instructed the patient to contact the office if any abnormal lab results are received.         Follow up in about 1 year (around 2/26/2026), or if symptoms worsen or fail to improve.    This note was generated with the assistance of ambient listening technology. Verbal consent was obtained by the patient and accompanying visitor(s) for the recording of patient appointment to facilitate this note

## 2025-02-28 ENCOUNTER — LAB VISIT (OUTPATIENT)
Dept: LAB | Facility: HOSPITAL | Age: 40
End: 2025-02-28
Attending: INTERNAL MEDICINE
Payer: COMMERCIAL

## 2025-02-28 DIAGNOSIS — Z00.00 ROUTINE GENERAL MEDICAL EXAMINATION AT HEALTH CARE FACILITY: ICD-10-CM

## 2025-02-28 LAB
ALBUMIN SERPL BCP-MCNC: 4.1 G/DL (ref 3.5–5.2)
ALP SERPL-CCNC: 59 U/L (ref 40–150)
ALT SERPL W/O P-5'-P-CCNC: 20 U/L (ref 10–44)
ANION GAP SERPL CALC-SCNC: 7 MMOL/L (ref 8–16)
AST SERPL-CCNC: 26 U/L (ref 10–40)
BASOPHILS # BLD AUTO: 0.04 K/UL (ref 0–0.2)
BASOPHILS NFR BLD: 0.6 % (ref 0–1.9)
BILIRUB SERPL-MCNC: 0.6 MG/DL (ref 0.1–1)
BUN SERPL-MCNC: 15 MG/DL (ref 6–20)
CALCIUM SERPL-MCNC: 9.2 MG/DL (ref 8.7–10.5)
CHLORIDE SERPL-SCNC: 105 MMOL/L (ref 95–110)
CHOLEST SERPL-MCNC: 172 MG/DL (ref 120–199)
CHOLEST/HDLC SERPL: 3.9 {RATIO} (ref 2–5)
CO2 SERPL-SCNC: 26 MMOL/L (ref 23–29)
COMPLEXED PSA SERPL-MCNC: 1 NG/ML (ref 0–4)
CREAT SERPL-MCNC: 1 MG/DL (ref 0.5–1.4)
DIFFERENTIAL METHOD BLD: ABNORMAL
EOSINOPHIL # BLD AUTO: 0.1 K/UL (ref 0–0.5)
EOSINOPHIL NFR BLD: 1.3 % (ref 0–8)
ERYTHROCYTE [DISTWIDTH] IN BLOOD BY AUTOMATED COUNT: 11.9 % (ref 11.5–14.5)
EST. GFR  (NO RACE VARIABLE): >60 ML/MIN/1.73 M^2
ESTIMATED AVG GLUCOSE: 100 MG/DL (ref 68–131)
GLUCOSE SERPL-MCNC: 80 MG/DL (ref 70–110)
HBA1C MFR BLD: 5.1 % (ref 4–5.6)
HCT VFR BLD AUTO: 44.6 % (ref 40–54)
HCV AB SERPL QL IA: NORMAL
HDLC SERPL-MCNC: 44 MG/DL (ref 40–75)
HDLC SERPL: 25.6 % (ref 20–50)
HGB BLD-MCNC: 14.1 G/DL (ref 14–18)
IMM GRANULOCYTES # BLD AUTO: 0.05 K/UL (ref 0–0.04)
IMM GRANULOCYTES NFR BLD AUTO: 0.8 % (ref 0–0.5)
LDLC SERPL CALC-MCNC: 114.4 MG/DL (ref 63–159)
LYMPHOCYTES # BLD AUTO: 2.8 K/UL (ref 1–4.8)
LYMPHOCYTES NFR BLD: 44.4 % (ref 18–48)
MCH RBC QN AUTO: 29.4 PG (ref 27–31)
MCHC RBC AUTO-ENTMCNC: 31.6 G/DL (ref 32–36)
MCV RBC AUTO: 93 FL (ref 82–98)
MONOCYTES # BLD AUTO: 0.5 K/UL (ref 0.3–1)
MONOCYTES NFR BLD: 8.5 % (ref 4–15)
NEUTROPHILS # BLD AUTO: 2.8 K/UL (ref 1.8–7.7)
NEUTROPHILS NFR BLD: 44.4 % (ref 38–73)
NONHDLC SERPL-MCNC: 128 MG/DL
NRBC BLD-RTO: 0 /100 WBC
PLATELET # BLD AUTO: 276 K/UL (ref 150–450)
PMV BLD AUTO: 9.4 FL (ref 9.2–12.9)
POTASSIUM SERPL-SCNC: 4.6 MMOL/L (ref 3.5–5.1)
PROT SERPL-MCNC: 7.4 G/DL (ref 6–8.4)
RBC # BLD AUTO: 4.8 M/UL (ref 4.6–6.2)
SODIUM SERPL-SCNC: 138 MMOL/L (ref 136–145)
TRIGL SERPL-MCNC: 68 MG/DL (ref 30–150)
WBC # BLD AUTO: 6.26 K/UL (ref 3.9–12.7)

## 2025-02-28 PROCEDURE — 86803 HEPATITIS C AB TEST: CPT | Performed by: INTERNAL MEDICINE

## 2025-02-28 PROCEDURE — 83036 HEMOGLOBIN GLYCOSYLATED A1C: CPT | Performed by: INTERNAL MEDICINE

## 2025-02-28 PROCEDURE — 80053 COMPREHEN METABOLIC PANEL: CPT | Performed by: INTERNAL MEDICINE

## 2025-02-28 PROCEDURE — 80061 LIPID PANEL: CPT | Performed by: INTERNAL MEDICINE

## 2025-02-28 PROCEDURE — 84153 ASSAY OF PSA TOTAL: CPT | Performed by: INTERNAL MEDICINE

## 2025-02-28 PROCEDURE — 85025 COMPLETE CBC W/AUTO DIFF WBC: CPT | Performed by: INTERNAL MEDICINE

## 2025-02-28 PROCEDURE — 36415 COLL VENOUS BLD VENIPUNCTURE: CPT | Mod: PO | Performed by: INTERNAL MEDICINE

## 2025-03-03 ENCOUNTER — RESULTS FOLLOW-UP (OUTPATIENT)
Dept: INTERNAL MEDICINE | Facility: CLINIC | Age: 40
End: 2025-03-03
Payer: COMMERCIAL